# Patient Record
Sex: FEMALE | Race: WHITE | NOT HISPANIC OR LATINO | URBAN - METROPOLITAN AREA
[De-identification: names, ages, dates, MRNs, and addresses within clinical notes are randomized per-mention and may not be internally consistent; named-entity substitution may affect disease eponyms.]

---

## 2022-07-06 RX ORDER — FUROSEMIDE 20 MG/1
TABLET ORAL
COMMUNITY

## 2022-07-06 RX ORDER — PSEUDOEPHEDRINE HCL 30 MG
TABLET ORAL
COMMUNITY

## 2022-07-06 RX ORDER — POLYETHYLENE GLYCOL 400 AND PROPYLENE GLYCOL 4; 3 MG/ML; MG/ML
SOLUTION/ DROPS OPHTHALMIC
COMMUNITY

## 2022-07-06 RX ORDER — ACETAMINOPHEN 500 MG
TABLET ORAL
COMMUNITY

## 2022-07-06 RX ORDER — AMIODARONE HYDROCHLORIDE 200 MG/1
TABLET ORAL
COMMUNITY

## 2022-07-06 RX ORDER — ATORVASTATIN CALCIUM 10 MG/1
TABLET, FILM COATED ORAL
COMMUNITY

## 2022-07-06 RX ORDER — FLUTICASONE PROPIONATE 50 MCG
SPRAY, SUSPENSION (ML) NASAL
COMMUNITY

## 2022-07-06 RX ORDER — UBIDECARENONE 100 MG
CAPSULE ORAL
COMMUNITY

## 2022-07-06 RX ORDER — OMEPRAZOLE 40 MG/1
CAPSULE, DELAYED RELEASE ORAL
COMMUNITY

## 2022-07-06 RX ORDER — TELMISARTAN 80 MG/1
TABLET ORAL
COMMUNITY

## 2022-07-06 RX ORDER — LEVOTHYROXINE SODIUM 88 UG/1
TABLET ORAL
COMMUNITY
End: 2022-08-08 | Stop reason: SDUPTHER

## 2022-07-06 RX ORDER — AMLODIPINE BESYLATE 5 MG/1
TABLET ORAL
COMMUNITY

## 2022-07-06 RX ORDER — METOPROLOL SUCCINATE 50 MG/1
TABLET, EXTENDED RELEASE ORAL
COMMUNITY

## 2022-08-08 PROBLEM — I10 HYPERTENSION, ESSENTIAL: Status: ACTIVE | Noted: 2022-08-08

## 2022-08-08 PROBLEM — G98.8 DISORDER OF NERVOUS SYSTEM DUE TO TYPE 2 DIABETES MELLITUS (HCC): Status: ACTIVE | Noted: 2022-08-08

## 2022-08-08 PROBLEM — I50.32 CHRONIC HEART FAILURE WITH PRESERVED EJECTION FRACTION (HCC): Status: ACTIVE | Noted: 2022-08-08

## 2022-08-08 PROBLEM — E11.69 DISORDER OF NERVOUS SYSTEM DUE TO TYPE 2 DIABETES MELLITUS (HCC): Status: ACTIVE | Noted: 2022-08-08

## 2022-08-08 PROBLEM — G47.33 OBSTRUCTIVE SLEEP APNEA (ADULT) (PEDIATRIC): Status: ACTIVE | Noted: 2022-08-08

## 2022-08-08 PROBLEM — I27.20 PULMONARY HTN (HCC): Status: ACTIVE | Noted: 2022-08-08

## 2022-08-08 PROBLEM — M19.91 PRIMARY OSTEOARTHRITIS: Status: ACTIVE | Noted: 2022-08-08

## 2022-08-08 PROBLEM — I48.91 ATRIAL FIBRILLATION (HCC): Status: ACTIVE | Noted: 2022-08-08

## 2022-08-08 PROBLEM — E03.9 HYPOTHYROIDISM (ACQUIRED): Status: ACTIVE | Noted: 2022-08-08

## 2022-08-08 PROBLEM — K35.80 ACUTE APPENDICITIS: Status: ACTIVE | Noted: 2022-08-08

## 2022-08-08 PROBLEM — K43.9 VENTRAL HERNIA WITHOUT OBSTRUCTION OR GANGRENE: Status: ACTIVE | Noted: 2022-08-08

## 2022-08-08 PROBLEM — I35.0 AORTIC STENOSIS: Status: ACTIVE | Noted: 2022-08-08

## 2022-08-08 PROBLEM — R91.8 LUNG MASS: Status: ACTIVE | Noted: 2022-08-08

## 2022-08-08 PROBLEM — J90 PLEURAL EFFUSION: Status: ACTIVE | Noted: 2022-08-08

## 2022-08-08 PROBLEM — M81.0 AGE-RELATED OSTEOPOROSIS WITHOUT CURRENT PATHOLOGICAL FRACTURE: Status: ACTIVE | Noted: 2022-08-08

## 2022-08-08 PROBLEM — E11.9 TYPE 2 DIABETES MELLITUS WITHOUT COMPLICATION, WITHOUT LONG-TERM CURRENT USE OF INSULIN (HCC): Status: ACTIVE | Noted: 2022-08-08

## 2022-08-08 PROBLEM — E78.00 PURE HYPERCHOLESTEROLEMIA: Status: ACTIVE | Noted: 2022-08-08

## 2022-08-08 PROBLEM — E78.5 HYPERLIPIDEMIA: Status: ACTIVE | Noted: 2022-08-08

## 2022-08-08 PROBLEM — E22.2 SIADH (SYNDROME OF INAPPROPRIATE ADH PRODUCTION) (HCC): Status: ACTIVE | Noted: 2022-08-08

## 2022-08-08 PROBLEM — K21.9 GERD WITHOUT ESOPHAGITIS: Status: ACTIVE | Noted: 2022-08-08

## 2022-08-08 PROBLEM — J30.9 ALLERGIC RHINITIS: Status: ACTIVE | Noted: 2022-08-08

## 2022-08-08 PROBLEM — R91.8 MULTIPLE PULMONARY NODULES: Status: ACTIVE | Noted: 2022-08-08

## 2022-08-15 PROBLEM — R78.81 BACTEREMIA DUE TO ENTEROCOCCUS: Status: ACTIVE | Noted: 2022-08-15

## 2022-08-15 PROBLEM — M25.552 LEFT HIP PAIN: Status: ACTIVE | Noted: 2022-08-15

## 2022-08-15 PROBLEM — B95.2 BACTEREMIA DUE TO ENTEROCOCCUS: Status: ACTIVE | Noted: 2022-08-15

## 2022-08-15 PROBLEM — E11.9 DM (DIABETES MELLITUS) (HCC): Status: ACTIVE | Noted: 2022-08-15

## 2022-08-15 PROBLEM — S22.000A THORACIC COMPRESSION FRACTURE (HCC): Status: ACTIVE | Noted: 2017-06-07

## 2022-08-15 PROBLEM — R45.89 TEARFULNESS: Status: ACTIVE | Noted: 2018-03-14

## 2022-09-19 PROBLEM — I50.32 CHRONIC DIASTOLIC CONGESTIVE HEART FAILURE (HCC): Status: ACTIVE | Noted: 2019-09-27

## 2022-09-19 PROBLEM — K21.9 GASTROESOPHAGEAL REFLUX DISEASE: Status: ACTIVE | Noted: 2017-08-14

## 2022-09-19 PROBLEM — E78.00 HYPERCHOLESTEROLEMIA: Status: ACTIVE | Noted: 2022-09-19

## 2022-12-28 PROBLEM — J90 PLEURAL EFFUSION: Status: RESOLVED | Noted: 2022-08-08 | Resolved: 2022-12-28

## 2022-12-28 PROBLEM — R91.8 MULTIPLE PULMONARY NODULES: Status: RESOLVED | Noted: 2022-08-08 | Resolved: 2022-12-28

## 2022-12-28 PROBLEM — E11.9 DM (DIABETES MELLITUS) (HCC): Status: RESOLVED | Noted: 2022-08-15 | Resolved: 2022-12-28

## 2022-12-28 PROBLEM — K35.80 ACUTE APPENDICITIS: Status: RESOLVED | Noted: 2022-08-08 | Resolved: 2022-12-28

## 2022-12-28 PROBLEM — Z79.01 LONG TERM CURRENT USE OF ANTICOAGULANT THERAPY: Status: ACTIVE | Noted: 2022-12-28

## 2022-12-28 PROBLEM — S22.000A THORACIC COMPRESSION FRACTURE (HCC): Status: RESOLVED | Noted: 2017-06-07 | Resolved: 2022-12-28

## 2022-12-28 PROBLEM — I50.9 CONGESTIVE HEART FAILURE (HCC): Status: ACTIVE | Noted: 2022-12-28

## 2022-12-28 PROBLEM — B95.2 BACTEREMIA DUE TO ENTEROCOCCUS: Status: RESOLVED | Noted: 2022-08-15 | Resolved: 2022-12-28

## 2022-12-28 PROBLEM — R45.89 TEARFULNESS: Status: RESOLVED | Noted: 2018-03-14 | Resolved: 2022-12-28

## 2022-12-28 PROBLEM — R91.8 LUNG MASS: Status: RESOLVED | Noted: 2022-08-08 | Resolved: 2022-12-28

## 2022-12-28 PROBLEM — R78.81 BACTEREMIA DUE TO ENTEROCOCCUS: Status: RESOLVED | Noted: 2022-08-15 | Resolved: 2022-12-28

## 2022-12-28 PROBLEM — E78.00 HYPERCHOLESTEROLEMIA: Status: RESOLVED | Noted: 2022-09-19 | Resolved: 2022-12-28

## 2022-12-28 PROBLEM — I50.9 CONGESTIVE HEART FAILURE (HCC): Status: RESOLVED | Noted: 2022-12-28 | Resolved: 2022-12-28

## 2022-12-28 PROBLEM — J44.9 CHRONIC OBSTRUCTIVE LUNG DISEASE (HCC): Status: ACTIVE | Noted: 2022-12-28

## 2022-12-28 PROBLEM — M25.552 LEFT HIP PAIN: Status: RESOLVED | Noted: 2022-08-15 | Resolved: 2022-12-28

## 2022-12-28 PROBLEM — M15.9 PRIMARY OSTEOARTHRITIS INVOLVING MULTIPLE JOINTS: Status: ACTIVE | Noted: 2022-08-08

## 2022-12-28 PROBLEM — R06.89 INEFFECTIVE AIRWAY CLEARANCE: Status: ACTIVE | Noted: 2022-12-28

## 2022-12-28 PROBLEM — R06.89 INEFFECTIVE AIRWAY CLEARANCE: Status: RESOLVED | Noted: 2022-12-28 | Resolved: 2022-12-28

## 2022-12-28 PROBLEM — M79.89 LEFT LEG SWELLING: Status: ACTIVE | Noted: 2022-12-28

## 2023-01-03 PROBLEM — I50.33 ACUTE ON CHRONIC DIASTOLIC CONGESTIVE HEART FAILURE (HCC): Status: ACTIVE | Noted: 2019-09-27

## 2023-01-31 PROBLEM — I35.0 AORTIC STENOSIS: Status: RESOLVED | Noted: 2022-08-08 | Resolved: 2023-01-31

## 2023-02-21 PROBLEM — M16.12 OSTEOARTHRITIS OF LEFT HIP: Status: ACTIVE | Noted: 2023-02-21

## 2023-02-28 PROBLEM — N30.90 CYSTITIS: Status: ACTIVE | Noted: 2023-02-28

## 2023-03-01 PROBLEM — M16.12 OSTEOARTHRITIS OF LEFT HIP, UNSPECIFIED OSTEOARTHRITIS TYPE: Status: ACTIVE | Noted: 2023-03-01

## 2023-03-02 PROBLEM — Z96.642 S/P TOTAL LEFT HIP ARTHROPLASTY: Status: ACTIVE | Noted: 2023-03-02

## 2023-03-02 PROBLEM — Z96.642 STATUS POST TOTAL REPLACEMENT OF LEFT HIP: Status: ACTIVE | Noted: 2023-03-02

## 2023-03-03 PROBLEM — Z96.642 STATUS POST TOTAL REPLACEMENT OF LEFT HIP: Status: RESOLVED | Noted: 2023-03-02 | Resolved: 2023-03-03

## 2023-03-03 PROBLEM — M16.12 OSTEOARTHRITIS OF LEFT HIP, UNSPECIFIED OSTEOARTHRITIS TYPE: Status: RESOLVED | Noted: 2023-03-01 | Resolved: 2023-03-03

## 2023-07-15 PROBLEM — I50.33 ACUTE ON CHRONIC DIASTOLIC HEART FAILURE (HCC): Status: ACTIVE | Noted: 2023-07-15

## 2023-07-15 PROBLEM — I50.23 ACUTE ON CHRONIC SYSTOLIC (CONGESTIVE) HEART FAILURE (HCC): Status: ACTIVE | Noted: 2023-07-15

## 2023-07-16 PROBLEM — I50.33 ACUTE ON CHRONIC DIASTOLIC (CONGESTIVE) HEART FAILURE (HCC): Status: ACTIVE | Noted: 2023-07-16

## 2023-08-19 PROBLEM — I50.23 ACUTE ON CHRONIC HFREF (HEART FAILURE WITH REDUCED EJECTION FRACTION) (HCC): Status: ACTIVE | Noted: 2023-08-19

## 2023-10-03 ENCOUNTER — NEW PATIENT (OUTPATIENT)
Facility: LOCATION | Age: 88
End: 2023-10-03

## 2023-10-03 DIAGNOSIS — E11.9: ICD-10-CM

## 2023-10-03 DIAGNOSIS — H35.3134: ICD-10-CM

## 2023-10-03 DIAGNOSIS — Z96.1: ICD-10-CM

## 2023-10-03 PROCEDURE — 92134 CPTRZ OPH DX IMG PST SGM RTA: CPT

## 2023-10-03 PROCEDURE — 92004 COMPRE OPH EXAM NEW PT 1/>: CPT

## 2023-10-03 ASSESSMENT — TONOMETRY
OD_IOP_MMHG: 15
OS_IOP_MMHG: 15

## 2023-10-03 ASSESSMENT — VISUAL ACUITY
OD_SC: CF 6FT
OS_SC: CF 6FT

## 2023-10-13 ENCOUNTER — ESTABLISHED PATIENT (OUTPATIENT)
Dept: URBAN - METROPOLITAN AREA CLINIC 11 | Facility: CLINIC | Age: 88
End: 2023-10-13

## 2023-10-13 DIAGNOSIS — H35.363: ICD-10-CM

## 2023-10-13 DIAGNOSIS — H35.3132: ICD-10-CM

## 2023-10-13 DIAGNOSIS — H43.813: ICD-10-CM

## 2023-10-13 PROCEDURE — 92014 COMPRE OPH EXAM EST PT 1/>: CPT

## 2023-10-13 PROCEDURE — 92201 OPSCPY EXTND RTA DRAW UNI/BI: CPT

## 2023-10-13 ASSESSMENT — TONOMETRY
OD_IOP_MMHG: 12
OS_IOP_MMHG: 11

## 2023-10-13 ASSESSMENT — VISUAL ACUITY
OS_CC: 20/100-1
OD_CC: 20/300

## 2023-10-15 PROBLEM — I50.9 HEART FAILURE (HCC): Status: ACTIVE | Noted: 2023-10-15

## 2023-10-15 PROBLEM — A41.9 SEPSIS (HCC): Status: ACTIVE | Noted: 2023-10-15

## 2023-11-01 PROBLEM — N18.30 CHRONIC RENAL DISEASE, STAGE III (HCC): Status: ACTIVE | Noted: 2023-11-01

## 2023-11-09 PROBLEM — N25.81 SECONDARY HYPERPARATHYROIDISM OF RENAL ORIGIN (HCC): Status: ACTIVE | Noted: 2023-11-09

## 2024-01-02 ENCOUNTER — CARE COORDINATION (OUTPATIENT)
Facility: CLINIC | Age: 89
End: 2024-01-02

## 2024-01-02 NOTE — CARE COORDINATION
Care Transitions     Care Transitions Follow Up Call    [place].  CTN attempted to reach patient for follow up.   Patient's family member answered the phone call and related that she could assist CTN.   Family member reported that her name is Radha.      Patient's family member provided information.   CTN did not disclose patient's medical information.      Patient demographics notes, Radha Rice as child of patient, for  the phone number which is on file for patient.         Patient: Salina Rice  Patient : [id number]   MRN: <[id number]>      Discharge Date: 23 RARS: Readmission Risk Score: 18      Needs to be reviewed by the provider   Additional needs identified to be addressed with provider:  Yes     Per patient's family member, patient has been enrolled into Hospice care.   Please verify as needed.      Family member reports that PCP is Dr. Youngblood.   Family  member could not provide first name for Dr. Youngblood.   Family member reports that Dr Youngblood is also \" over\" the hospice company.                 Method of communication with provider: none.    Patient's family member, Radha, reports the following:   - Patient has been enrolled into hospice.   - Hospice company is [facility]   - PCP is Dr. Youngblood.    - Family member is unsure if the first name of Dr. Youngblood. However, he is also \"over\"  the hospice agency as well.    Patient is currently residing at [facility].          Follow Up  Future Appointments   Date Time Provider Department Center   2024 11:00 AM Sacha Meyers PA-C [facility]    2024  1:30 PM Jose Mireles MD [facility]    2024 11:30 AM Matty Major PA [facility]      Interventions to address risk factors:   Family referred to Hospice staff / PCP for any needs, concerns, or questions.        Care Transitions Subsequent and